# Patient Record
Sex: FEMALE | Race: BLACK OR AFRICAN AMERICAN | Employment: FULL TIME | URBAN - METROPOLITAN AREA
[De-identification: names, ages, dates, MRNs, and addresses within clinical notes are randomized per-mention and may not be internally consistent; named-entity substitution may affect disease eponyms.]

---

## 2017-04-21 ENCOUNTER — HOSPITAL ENCOUNTER (OUTPATIENT)
Age: 27
Discharge: HOME | End: 2017-04-21
Attending: FAMILY MEDICINE
Payer: MEDICAID

## 2017-04-21 VITALS
HEIGHT: 64 IN | SYSTOLIC BLOOD PRESSURE: 117 MMHG | HEART RATE: 76 BPM | BODY MASS INDEX: 21.51 KG/M2 | DIASTOLIC BLOOD PRESSURE: 74 MMHG | OXYGEN SATURATION: 100 % | RESPIRATION RATE: 16 BRPM | WEIGHT: 126 LBS | TEMPERATURE: 99 F

## 2017-04-21 DIAGNOSIS — B37.3 VAGINAL CANDIDIASIS: ICD-10-CM

## 2017-04-21 DIAGNOSIS — Z30.016 ENCOUNTER FOR INITIAL PRESCRIPTION OF TRANSDERMAL PATCH HORMONAL CONTRACEPTIVE DEVICE: ICD-10-CM

## 2017-04-21 DIAGNOSIS — B96.89 BACTERIAL VAGINOSIS: Primary | ICD-10-CM

## 2017-04-21 DIAGNOSIS — N76.0 BACTERIAL VAGINOSIS: Primary | ICD-10-CM

## 2017-04-21 PROCEDURE — 99214 OFFICE O/P EST MOD 30 MIN: CPT

## 2017-04-21 PROCEDURE — 81025 URINE PREGNANCY TEST: CPT | Performed by: FAMILY MEDICINE

## 2017-04-21 PROCEDURE — 87480 CANDIDA DNA DIR PROBE: CPT | Performed by: FAMILY MEDICINE

## 2017-04-21 PROCEDURE — 87591 N.GONORRHOEAE DNA AMP PROB: CPT | Performed by: FAMILY MEDICINE

## 2017-04-21 PROCEDURE — 87491 CHLMYD TRACH DNA AMP PROBE: CPT | Performed by: FAMILY MEDICINE

## 2017-04-21 PROCEDURE — 81002 URINALYSIS NONAUTO W/O SCOPE: CPT | Performed by: FAMILY MEDICINE

## 2017-04-21 PROCEDURE — 87510 GARDNER VAG DNA DIR PROBE: CPT | Performed by: FAMILY MEDICINE

## 2017-04-21 PROCEDURE — 87086 URINE CULTURE/COLONY COUNT: CPT | Performed by: FAMILY MEDICINE

## 2017-04-21 PROCEDURE — 87660 TRICHOMONAS VAGIN DIR PROBE: CPT | Performed by: FAMILY MEDICINE

## 2017-04-21 RX ORDER — METRONIDAZOLE 7.5 MG/G
1 GEL VAGINAL NIGHTLY
Qty: 70 G | Refills: 0 | Status: SHIPPED | OUTPATIENT
Start: 2017-04-21 | End: 2017-04-26

## 2017-04-21 RX ORDER — FLUCONAZOLE 150 MG/1
TABLET ORAL
Qty: 3 TABLET | Refills: 0 | Status: SHIPPED | OUTPATIENT
Start: 2017-04-21 | End: 2017-04-27

## 2017-04-21 NOTE — ED PROVIDER NOTES
Patient Seen in: [address] [facility]    History   Patient presents with:  Eval-G (gynecologic)    Stated Complaint: female issues    HPI  66-year-old female coming in with complaints of one make a foul odor/fishy discharge, has had BV in the O2 Device 04/21/17 1250 None (Room air)       Current:/74 mmHg  Pulse 76  Temp(Src) 99.2 °F (37.3 °C) (Temporal)  Resp 16  Ht 162.6 cm (5' 4\")  Wt 57.153 kg  BMI 21.62 kg/m2  SpO2 100%  LMP 04/07/2017 (Approximate)  Breastfeeding?  Unknown    Physi Refill:  0    Patient verbalized understanding and agreed with the plan. MDM         Ortho Evra patch    Change patch once a week for three weeks (21 total days), followed by one week that is patch-free.  Should always be changed/applied on the same buttock weekly for 3 weeks and this is followed by a patch free week., Normal, Disp-3 patch, R-0

## 2017-04-21 NOTE — ED INITIAL ASSESSMENT (HPI)
1 week ago foul odor and vaginal discharge (white thick discharge) - treated 1 month ago with OTC Monistat for similar symptoms and it cleared and returned - denies vaginal itching or pain - denies abdominal or pelvic pain - denies fever - denies urinary s

## 2017-04-22 NOTE — ED NOTES
Left message to call back.     VAGINITIS/VAGINOSIS, DNA PROBE   Status: Final result     Visible to patient:  Not Released                   Specimen Information: Vaginal; Other                Notes Recorded by Jael Wiley MD on 4/22/2017 at 2:52 PM  P

## 2018-05-07 ENCOUNTER — HOSPITAL ENCOUNTER (EMERGENCY)
Age: 28
Discharge: HOME | End: 2018-05-07
Attending: EMERGENCY MEDICINE
Payer: MEDICAID

## 2018-05-07 VITALS
RESPIRATION RATE: 18 BRPM | WEIGHT: 120 LBS | BODY MASS INDEX: 21 KG/M2 | SYSTOLIC BLOOD PRESSURE: 120 MMHG | DIASTOLIC BLOOD PRESSURE: 70 MMHG | HEART RATE: 83 BPM | OXYGEN SATURATION: 100 % | TEMPERATURE: 99 F

## 2018-05-07 DIAGNOSIS — L02.31 LEFT BUTTOCK ABSCESS: Primary | ICD-10-CM

## 2018-05-07 DIAGNOSIS — N89.8 VAGINAL DISCHARGE: ICD-10-CM

## 2018-05-07 PROCEDURE — 99281 EMR DPT VST MAYX REQ PHY/QHP: CPT

## 2018-05-07 RX ORDER — MICONAZOLE NITRATE 100 UG/1
100 SUPPOSITORY VAGINAL NIGHTLY
COMMUNITY

## 2018-05-07 RX ORDER — ACYCLOVIR 200 MG/1
200 CAPSULE ORAL
COMMUNITY

## 2018-05-07 NOTE — ED INITIAL ASSESSMENT (HPI)
States thick vaginal discharge no relief with monostat, also noted \"bump\" to left butt cheek just noted yesterday.  Yeast infection onset Friday

## 2018-05-07 NOTE — ED PROVIDER NOTES
Patient Seen in: [facility] In [place]    History   Patient presents with:  Eval-G (gynecologic)    Stated Complaint: eval g     HPI    Patient is a 43-year-old female who states for the last month she has had vaginal discharge which roughly 2 and half by 2 cm slightly fluctuant no drainage noted. Minimal erythema  ABDOMEN: + Bowel sounds, soft, nontender, nondistended. No rebound, no guarding, no hepatosplenomegaly.   EXTREMITIES: Full range of motion, no tenderness, good capillary r

## (undated) NOTE — ED AVS SNAPSHOT
THE [facility] in [place] [address] [address]    Phone:  [phone]    Fax:  [phone]   MRN: [id number]    Department:  THE [facility] in [place]   Date of Visit:  4/21/2017           Diagn - fluconazole 150 MG Tabs  - metRONIDAZOLE 0.75 % Gel  - Norelgestromin-Eth Estradiol 150-35 MCG/24HR Ptwk              Discharge Instructions         Ortho Evra patch    Change patch once a week for three weeks (21 total days), followed by one week that If you have any problems with your follow-up, please call our  at [phone]. Si usted tiene algun problema con jung sequimiento, por favor llame a nuestro adminstrador de casos al [phone].     Expect to receive an electronic reques [facility] [address] ([address]) Christophe Galloway [phone]   [facility]. ([address] [facility]) [address]  (Do Enter your [employer] Activation Code exactly as it appears below along with your Zip Code and Date of Birth to complete the sign-up process. If you do not sign up before the expiration date, you must request a new code.     Your unique [employer] Access Code: [email or web address]

## (undated) NOTE — LETTER
[facility] IN [place]  [address] [id number]  Dept: [phone]  Dept Fax: [phone]         April 22, 2017    Patient: Bob Ying   YOB: 1990   Date of Visit: 4/21/2017       To Whom It May Concern:    Buzz Jordan